# Patient Record
Sex: FEMALE | Race: WHITE
[De-identification: names, ages, dates, MRNs, and addresses within clinical notes are randomized per-mention and may not be internally consistent; named-entity substitution may affect disease eponyms.]

---

## 2019-10-10 ENCOUNTER — HOSPITAL ENCOUNTER (OUTPATIENT)
Dept: HOSPITAL 74 - FASU | Age: 39
Discharge: HOME | End: 2019-10-10
Attending: SURGERY
Payer: COMMERCIAL

## 2019-10-10 VITALS — HEART RATE: 52 BPM | SYSTOLIC BLOOD PRESSURE: 133 MMHG | DIASTOLIC BLOOD PRESSURE: 65 MMHG

## 2019-10-10 VITALS — TEMPERATURE: 98 F

## 2019-10-10 VITALS — BODY MASS INDEX: 28.9 KG/M2

## 2019-10-10 DIAGNOSIS — K95.09: ICD-10-CM
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
## 2019-10-10 DIAGNOSIS — K66.0: ICD-10-CM

## 2019-10-10 DIAGNOSIS — T85.518A: Primary | ICD-10-CM
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
## 2019-10-10 DIAGNOSIS — K31.89: ICD-10-CM

## 2019-10-10 DIAGNOSIS — R11.10: ICD-10-CM
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
## 2019-10-10 DIAGNOSIS — R10.13: ICD-10-CM

## 2019-10-10 PROCEDURE — 0DN64ZZ RELEASE STOMACH, PERCUTANEOUS ENDOSCOPIC APPROACH: ICD-10-PCS | Performed by: SURGERY

## 2019-10-10 PROCEDURE — 0DP64CZ REMOVAL OF EXTRALUMINAL DEVICE FROM STOMACH, PERCUTANEOUS ENDOSCOPIC APPROACH: ICD-10-PCS | Performed by: SURGERY

## 2019-10-10 NOTE — SURG
Surgery First Assist Note


First Assist: Vivienne Mark PA-C


Date of Service: 10/10/19


Diagnosis: 





 Epigastric Abdominal Pain.  Vomiting.  Mechanical complication of implantable 

device secondary to gastric band





Procedure: 








Removal of Gastric Band plus subcutaneous port component.  Laparoscopic lysis 

of adhesions.  Excision of fibrous capsule around stomach.  Diagnostic 

Laparoscopy





I was present for the entirety of the operative procedure. For further detail, 

please refer to operative report.








Visit type





- Case Type


Case Type: Scheduled





- Emergency


Emergency Visit: No





- New patient


This patient is new to me today: Yes


Date on this admission: 10/10/19

## 2019-10-10 NOTE — OP
Operative Note





- Note:


Operative Date: 10/10/19


Pre-Operative Diagnosis: Epigastric Abdominal Pain.  Vomiting.  Mechanical 

complication of implantable device secondary to gastric band


Operation: Removal of Gastric Band plus subcutaneous port component.  

Laparoscopic lysis of adhesions.  Excision of fibrous capsule around stomach.  

Diagnostic Laparoscopy


Findings: 





Gastric Band noted with thick fibrous capsule around Band on lesser and greater 

curve areas





Areas of adhesions between omentum and band tubing


Post-Operative Diagnosis: Same as Pre-op (Abdominal adhesions; fibrous capsule 

around band)


Surgeon: Oumar Davila


Assistant: Vivienne Mark


Anesthesia: General


Specimens Removed: Gastric Band plus sub-Q port


Estimated Blood Loss (mls): 15


Operative Report Dictated: Yes

## 2019-10-10 NOTE — OP
DATE OF OPERATION:  10/10/2019

 

PREOPERATIVE DIAGNOSES: 

1.  Epigastric abdominal pain.

2.  Vomiting.

3.  Mechanical complication of implantable device secondary to gastric band.

 

POSTOPERATIVE DIAGNOSES: 

1.  Epigastric abdominal pain.

2.  Vomiting.

3.  Mechanical complication of implantable device secondary to gastric band.

4.  Abdominal adhesions.

5.  Fibrous capsule around the band.

 

PROCEDURES PERFORMED:  

1.  Removal of gastric band plus subcutaneous port component.

2.  Laparoscopic lysis of adhesions.

3.  Excision of fibrous capsule around the stomach.

4.  Diagnostic laparoscopy.

 

OPERATING SURGEON:  Oumar Davila MD.

 

ASSISTANT:  CAIO Sumner 

 

ANESTHESIA:  General.

 

EXPECTED BLOOD LOSS:  15 mL.

 

DESCRIPTION OF PROCEDURE:  Patient was brought into the operating room, placed on the

OR table in a supine position.  All precautions were taken initially including

padding for the back and the feet, and Venodyne boots were placed on both lower

extremities.  At that point, the abdomen was prepped and draped in the usual manner. 

A Veress needle was placed in the left upper quadrant, and a pneumoperitoneum was

established.  Under direct vision with a No. 5 trocar in an Optiview trocar so that a

camera was placed and under direct vision, a No. 5 trocar was placed in that left

upper quadrant location.  Through that trocar, laparoscopic camera was placed.  Under

direct vision, two No. 5 bladeless trocars were placed in the right upper quadrant,

and a No. 5 bladeless trocar was placed more laterally in the left upper quadrant by

the left costal margin.  A Jerardo liver retractor was then placed in the

epigastrium to retract the left lobe of the liver.

 

The patient was then placed in 20-degree reverse Trendelenburg position by

anesthesia.  The band tubing was noted going to the band, and there was significant

scar tissue noted around the tubing and the band itself.  The scar tissue was mostly

omentum, and it was dissected off of the band with the electrocautery.  This was done

carefully not to cause any injury to the underlying stomach and was done to clear up

the adhesions off the band.  However, minimal dissection was possible because of

patient's history of DVT and the need for anticoagulation immediately postoperative.

 

With the assistant surgeon grabbing the band tubing and pulling it or retracting it

to the patient's left side, the operating surgeon dissected the fibrous capsule off

the band, which was on the lesser curvature side until the entire lesser curvature of

the band was in full view and it was mobile.  At this juncture, the operating surgeon

pulled the band tubing to the patient's right side as the assistant surgeon retracted

the stomach on the greater curvature inferiorly.  Here, the fibrous capsule was very

thickened around the band, but it was carefully dissected off the band until the

entire band was in full view anteriorly.  The band was then opened, and the tubing

was cut at the take off to the subcutaneous port, and the band was then removed from

around the stomach in 1 piece and sent off the field as a specimen to pathology.

 

Attention was directed to the fibrous capsule around the stomach.  This fibrous

capsule was very flimsy.  Because of the risk of causing bleeding, which would have

held up giving anticoagulation, it was decided not to engage the fibrous capsule at

this point.  Under direct vision, all trocars were removed, and pneumoperitoneum was

released.  

 

The port had been placed right below the xiphoid on previous surgeries.  Incision was

made over that area for about 2 to 2-1/2 cm.  The incision was carried down through

the skin and subcutaneous tissue with electrocautery.  The port was then noted, and

there was a fibrous capsule that was dissected off the port, and the port and the

rest of the tubing were removed and sent off the field as a specimen with the rest of

the band.

 

At this juncture, all trocar sites received 0.25% Marcaine.  They were closed with

4-0 Biosyn in subcuticular fashion except where the port was, which was 1st closed

with 3-0 Vicryl in the subcutaneous tissue then closed with 4-0 Biosyn in

subcuticular fashion.  Dressings were applied.  The patient was awoken from

anesthesia and transferred out of the operating room to the recovery room in stable

condition.  

 

 

ANSHU [id number]

DD: 10/10/2019 14:01

DT: 10/10/2019 14:53

Job #:  [id number]